# Patient Record
Sex: MALE | Race: BLACK OR AFRICAN AMERICAN | Employment: OTHER | URBAN - METROPOLITAN AREA
[De-identification: names, ages, dates, MRNs, and addresses within clinical notes are randomized per-mention and may not be internally consistent; named-entity substitution may affect disease eponyms.]

---

## 2022-12-05 ENCOUNTER — APPOINTMENT (OUTPATIENT)
Dept: GENERAL RADIOLOGY | Age: 77
End: 2022-12-05
Payer: OTHER MISCELLANEOUS

## 2022-12-05 ENCOUNTER — APPOINTMENT (OUTPATIENT)
Dept: CT IMAGING | Age: 77
End: 2022-12-05
Payer: OTHER MISCELLANEOUS

## 2022-12-05 ENCOUNTER — HOSPITAL ENCOUNTER (OUTPATIENT)
Age: 77
Setting detail: OBSERVATION
Discharge: HOME | End: 2022-12-06
Attending: EMERGENCY MEDICINE | Admitting: INTERNAL MEDICINE
Payer: OTHER MISCELLANEOUS

## 2022-12-05 DIAGNOSIS — R40.2412 GLASGOW COMA SCALE TOTAL SCORE 13-15, AT ARRIVAL TO EMERGENCY DEPARTMENT: ICD-10-CM

## 2022-12-05 DIAGNOSIS — T14.90XA TRAUMA: Primary | ICD-10-CM

## 2022-12-05 DIAGNOSIS — R41.0 CONFUSION: ICD-10-CM
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
## 2022-12-05 PROBLEM — S06.0XAA CONCUSSION: Status: ACTIVE | Noted: 2022-12-05

## 2022-12-05 PROBLEM — R41.82 ALTERED MENTAL STATUS: Status: ACTIVE | Noted: 2022-12-05

## 2022-12-05 LAB
ACETAMINOPHEN LEVEL: <5 MCG/ML (ref 10–30)
ALBUMIN SERPL-MCNC: 4.1 G/DL (ref 3.5–5.2)
ALP BLD-CCNC: 68 U/L (ref 40–129)
ALT SERPL-CCNC: <5 U/L (ref 0–40)
AMPHETAMINE SCREEN, URINE: NOT DETECTED
ANION GAP SERPL CALCULATED.3IONS-SCNC: 12 MMOL/L (ref 7–16)
APTT: 29.3 SEC (ref 24.5–35.1)
AST SERPL-CCNC: 14 U/L (ref 0–39)
BARBITURATE SCREEN URINE: NOT DETECTED
BASOPHILS ABSOLUTE: 0.04 E9/L (ref 0–0.2)
BASOPHILS RELATIVE PERCENT: 0.5 % (ref 0–2)
BENZODIAZEPINE SCREEN, URINE: NOT DETECTED
BILIRUB SERPL-MCNC: 0.5 MG/DL (ref 0–1.2)
BILIRUBIN URINE: NEGATIVE
BLOOD, URINE: NEGATIVE
BUN BLDV-MCNC: 14 MG/DL (ref 6–23)
CALCIUM SERPL-MCNC: 9.2 MG/DL (ref 8.6–10.2)
CANNABINOID SCREEN URINE: NOT DETECTED
CHLORIDE BLD-SCNC: 101 MMOL/L (ref 98–107)
CLARITY: CLEAR
CO2: 24 MMOL/L (ref 22–29)
COCAINE METABOLITE SCREEN URINE: NOT DETECTED
COLOR: YELLOW
CREAT SERPL-MCNC: 1.1 MG/DL (ref 0.7–1.2)
EOSINOPHILS ABSOLUTE: 0.2 E9/L (ref 0.05–0.5)
EOSINOPHILS RELATIVE PERCENT: 2.3 % (ref 0–6)
ETHANOL: <10 MG/DL (ref 0–0.08)
FENTANYL SCREEN, URINE: NOT DETECTED
GFR SERPL CREATININE-BSD FRML MDRD: >60 ML/MIN/1.73
GLUCOSE BLD-MCNC: 120 MG/DL (ref 74–99)
GLUCOSE URINE: NEGATIVE MG/DL
HCT VFR BLD CALC: 44.4 % (ref 37–54)
HEMOGLOBIN: 14.7 G/DL (ref 12.5–16.5)
IMMATURE GRANULOCYTES #: 0.02 E9/L
IMMATURE GRANULOCYTES %: 0.2 % (ref 0–5)
INR BLD: 1.3
KETONES, URINE: NEGATIVE MG/DL
LACTIC ACID: 2 MMOL/L (ref 0.5–2.2)
LEUKOCYTE ESTERASE, URINE: NEGATIVE
LYMPHOCYTES ABSOLUTE: 2.84 E9/L (ref 1.5–4)
LYMPHOCYTES RELATIVE PERCENT: 32.8 % (ref 20–42)
Lab: NORMAL
MAGNESIUM: 1.9 MG/DL (ref 1.6–2.6)
MCH RBC QN AUTO: 30.6 PG (ref 26–35)
MCHC RBC AUTO-ENTMCNC: 33.1 % (ref 32–34.5)
MCV RBC AUTO: 92.5 FL (ref 80–99.9)
METHADONE SCREEN, URINE: NOT DETECTED
MONOCYTES ABSOLUTE: 0.75 E9/L (ref 0.1–0.95)
MONOCYTES RELATIVE PERCENT: 8.7 % (ref 2–12)
NEUTROPHILS ABSOLUTE: 4.8 E9/L (ref 1.8–7.3)
NEUTROPHILS RELATIVE PERCENT: 55.5 % (ref 43–80)
NITRITE, URINE: NEGATIVE
OPIATE SCREEN URINE: NOT DETECTED
OXYCODONE URINE: NOT DETECTED
PDW BLD-RTO: 14.7 FL (ref 11.5–15)
PH UA: 6 (ref 5–9)
PHENCYCLIDINE SCREEN URINE: NOT DETECTED
PLATELET # BLD: 365 E9/L (ref 130–450)
PMV BLD AUTO: 11 FL (ref 7–12)
POTASSIUM SERPL-SCNC: 3.7 MMOL/L (ref 3.5–5)
PROTEIN UA: NEGATIVE MG/DL
PROTHROMBIN TIME: 14.4 SEC (ref 9.3–12.4)
RBC # BLD: 4.8 E12/L (ref 3.8–5.8)
SALICYLATE, SERUM: <0.3 MG/DL (ref 0–30)
SODIUM BLD-SCNC: 137 MMOL/L (ref 132–146)
SPECIFIC GRAVITY UA: 1.01 (ref 1–1.03)
TOTAL CK: 157 U/L (ref 20–200)
TOTAL PROTEIN: 7.4 G/DL (ref 6.4–8.3)
TRICYCLIC ANTIDEPRESSANTS SCREEN SERUM: NEGATIVE NG/ML
TROPONIN, HIGH SENSITIVITY: 27 NG/L (ref 0–11)
TROPONIN, HIGH SENSITIVITY: 41 NG/L (ref 0–11)
UROBILINOGEN, URINE: 0.2 E.U./DL
WBC # BLD: 8.7 E9/L (ref 4.5–11.5)

## 2022-12-05 PROCEDURE — G0378 HOSPITAL OBSERVATION PER HR: HCPCS

## 2022-12-05 PROCEDURE — 99285 EMERGENCY DEPT VISIT HI MDM: CPT

## 2022-12-05 PROCEDURE — 6830039000 HC L3 TRAUMA ALERT

## 2022-12-05 PROCEDURE — 80307 DRUG TEST PRSMV CHEM ANLYZR: CPT

## 2022-12-05 PROCEDURE — 85025 COMPLETE CBC W/AUTO DIFF WBC: CPT

## 2022-12-05 PROCEDURE — 2580000003 HC RX 258: Performed by: EMERGENCY MEDICINE

## 2022-12-05 PROCEDURE — 6370000000 HC RX 637 (ALT 250 FOR IP): Performed by: INTERNAL MEDICINE

## 2022-12-05 PROCEDURE — 6360000004 HC RX CONTRAST MEDICATION: Performed by: RADIOLOGY

## 2022-12-05 PROCEDURE — 82550 ASSAY OF CK (CPK): CPT

## 2022-12-05 PROCEDURE — 74177 CT ABD & PELVIS W/CONTRAST: CPT

## 2022-12-05 PROCEDURE — 72131 CT LUMBAR SPINE W/O DYE: CPT

## 2022-12-05 PROCEDURE — 80179 DRUG ASSAY SALICYLATE: CPT

## 2022-12-05 PROCEDURE — 72170 X-RAY EXAM OF PELVIS: CPT

## 2022-12-05 PROCEDURE — 84484 ASSAY OF TROPONIN QUANT: CPT

## 2022-12-05 PROCEDURE — 96372 THER/PROPH/DIAG INJ SC/IM: CPT

## 2022-12-05 PROCEDURE — 71260 CT THORAX DX C+: CPT

## 2022-12-05 PROCEDURE — 82077 ASSAY SPEC XCP UR&BREATH IA: CPT

## 2022-12-05 PROCEDURE — 72128 CT CHEST SPINE W/O DYE: CPT

## 2022-12-05 PROCEDURE — 99220 PR INITIAL OBSERVATION CARE/DAY 70 MINUTES: CPT | Performed by: INTERNAL MEDICINE

## 2022-12-05 PROCEDURE — 2580000003 HC RX 258: Performed by: INTERNAL MEDICINE

## 2022-12-05 PROCEDURE — 93005 ELECTROCARDIOGRAM TRACING: CPT | Performed by: EMERGENCY MEDICINE

## 2022-12-05 PROCEDURE — 6360000002 HC RX W HCPCS: Performed by: INTERNAL MEDICINE

## 2022-12-05 PROCEDURE — 81003 URINALYSIS AUTO W/O SCOPE: CPT

## 2022-12-05 PROCEDURE — 85730 THROMBOPLASTIN TIME PARTIAL: CPT

## 2022-12-05 PROCEDURE — 72125 CT NECK SPINE W/O DYE: CPT

## 2022-12-05 PROCEDURE — 80053 COMPREHEN METABOLIC PANEL: CPT

## 2022-12-05 PROCEDURE — 83735 ASSAY OF MAGNESIUM: CPT

## 2022-12-05 PROCEDURE — 80143 DRUG ASSAY ACETAMINOPHEN: CPT

## 2022-12-05 PROCEDURE — 36415 COLL VENOUS BLD VENIPUNCTURE: CPT

## 2022-12-05 PROCEDURE — 70450 CT HEAD/BRAIN W/O DYE: CPT

## 2022-12-05 PROCEDURE — 71045 X-RAY EXAM CHEST 1 VIEW: CPT

## 2022-12-05 PROCEDURE — 85610 PROTHROMBIN TIME: CPT

## 2022-12-05 PROCEDURE — 83605 ASSAY OF LACTIC ACID: CPT

## 2022-12-05 RX ORDER — ATORVASTATIN CALCIUM 10 MG/1
TABLET, FILM COATED ORAL DAILY
Status: ON HOLD | COMMUNITY
End: 2022-12-06 | Stop reason: HOSPADM

## 2022-12-05 RX ORDER — LANOLIN ALCOHOL/MO/W.PET/CERES
1000 CREAM (GRAM) TOPICAL DAILY
Status: DISCONTINUED | OUTPATIENT
Start: 2022-12-05 | End: 2022-12-06 | Stop reason: HOSPADM

## 2022-12-05 RX ORDER — ACETAMINOPHEN 325 MG/1
650 TABLET ORAL EVERY 6 HOURS PRN
Status: DISCONTINUED | OUTPATIENT
Start: 2022-12-05 | End: 2022-12-06 | Stop reason: HOSPADM

## 2022-12-05 RX ORDER — ACETAMINOPHEN 650 MG/1
650 SUPPOSITORY RECTAL EVERY 6 HOURS PRN
Status: DISCONTINUED | OUTPATIENT
Start: 2022-12-05 | End: 2022-12-06 | Stop reason: HOSPADM

## 2022-12-05 RX ORDER — SODIUM CHLORIDE 9 MG/ML
INJECTION, SOLUTION INTRAVENOUS PRN
Status: DISCONTINUED | OUTPATIENT
Start: 2022-12-05 | End: 2022-12-06 | Stop reason: HOSPADM

## 2022-12-05 RX ORDER — ENOXAPARIN SODIUM 100 MG/ML
40 INJECTION SUBCUTANEOUS DAILY
Status: DISCONTINUED | OUTPATIENT
Start: 2022-12-05 | End: 2022-12-06 | Stop reason: HOSPADM

## 2022-12-05 RX ORDER — VERAPAMIL HYDROCHLORIDE 240 MG/1
240 CAPSULE, EXTENDED RELEASE ORAL NIGHTLY
COMMUNITY

## 2022-12-05 RX ORDER — SODIUM CHLORIDE 9 MG/ML
INJECTION, SOLUTION INTRAVENOUS CONTINUOUS
Status: DISCONTINUED | OUTPATIENT
Start: 2022-12-05 | End: 2022-12-06

## 2022-12-05 RX ORDER — LANOLIN ALCOHOL/MO/W.PET/CERES
1000 CREAM (GRAM) TOPICAL DAILY
COMMUNITY

## 2022-12-05 RX ORDER — TADALAFIL 20 MG/1
20 TABLET ORAL PRN
COMMUNITY

## 2022-12-05 RX ORDER — PROMETHAZINE HYDROCHLORIDE 25 MG/1
12.5 TABLET ORAL EVERY 6 HOURS PRN
Status: DISCONTINUED | OUTPATIENT
Start: 2022-12-05 | End: 2022-12-06 | Stop reason: HOSPADM

## 2022-12-05 RX ORDER — POLYETHYLENE GLYCOL 3350 17 G/17G
17 POWDER, FOR SOLUTION ORAL DAILY PRN
Status: DISCONTINUED | OUTPATIENT
Start: 2022-12-05 | End: 2022-12-06 | Stop reason: HOSPADM

## 2022-12-05 RX ORDER — SODIUM CHLORIDE 0.9 % (FLUSH) 0.9 %
10 SYRINGE (ML) INJECTION PRN
Status: DISCONTINUED | OUTPATIENT
Start: 2022-12-05 | End: 2022-12-06 | Stop reason: HOSPADM

## 2022-12-05 RX ORDER — ONDANSETRON 2 MG/ML
4 INJECTION INTRAMUSCULAR; INTRAVENOUS EVERY 6 HOURS PRN
Status: DISCONTINUED | OUTPATIENT
Start: 2022-12-05 | End: 2022-12-06 | Stop reason: HOSPADM

## 2022-12-05 RX ORDER — COLCHICINE 0.6 MG/1
0.6 TABLET ORAL DAILY
Status: DISCONTINUED | OUTPATIENT
Start: 2022-12-05 | End: 2022-12-06 | Stop reason: HOSPADM

## 2022-12-05 RX ORDER — SODIUM CHLORIDE 0.9 % (FLUSH) 0.9 %
10 SYRINGE (ML) INJECTION EVERY 12 HOURS SCHEDULED
Status: DISCONTINUED | OUTPATIENT
Start: 2022-12-05 | End: 2022-12-06 | Stop reason: HOSPADM

## 2022-12-05 RX ORDER — CYANOCOBALAMIN 1000 UG/ML
1000 INJECTION, SOLUTION INTRAMUSCULAR; SUBCUTANEOUS
COMMUNITY

## 2022-12-05 RX ORDER — COLCHICINE 0.6 MG/1
0.6 TABLET ORAL DAILY
COMMUNITY

## 2022-12-05 RX ADMIN — SODIUM CHLORIDE: 9 INJECTION, SOLUTION INTRAVENOUS at 22:10

## 2022-12-05 RX ADMIN — VERAPAMIL HYDROCHLORIDE 240 MG: 120 TABLET, FILM COATED, EXTENDED RELEASE ORAL at 22:06

## 2022-12-05 RX ADMIN — SODIUM CHLORIDE, PRESERVATIVE FREE 10 ML: 5 INJECTION INTRAVENOUS at 22:10

## 2022-12-05 RX ADMIN — SODIUM CHLORIDE, PRESERVATIVE FREE 10 ML: 5 INJECTION INTRAVENOUS at 10:21

## 2022-12-05 RX ADMIN — SODIUM CHLORIDE: 9 INJECTION, SOLUTION INTRAVENOUS at 01:59

## 2022-12-05 RX ADMIN — ENOXAPARIN SODIUM 40 MG: 100 INJECTION SUBCUTANEOUS at 10:20

## 2022-12-05 RX ADMIN — IOPAMIDOL 75 ML: 755 INJECTION, SOLUTION INTRAVENOUS at 02:19

## 2022-12-05 RX ADMIN — CYANOCOBALAMIN TAB 1000 MCG 1000 MCG: 1000 TAB at 18:27

## 2022-12-05 RX ADMIN — ACETAMINOPHEN 650 MG: 325 TABLET ORAL at 16:51

## 2022-12-05 ASSESSMENT — PAIN SCALES - GENERAL
PAINLEVEL_OUTOF10: 3
PAINLEVEL_OUTOF10: 0
PAINLEVEL_OUTOF10: 0

## 2022-12-05 ASSESSMENT — PAIN DESCRIPTION - DESCRIPTORS: DESCRIPTORS: SORE;ACHING

## 2022-12-05 ASSESSMENT — PAIN DESCRIPTION - LOCATION: LOCATION: SHOULDER

## 2022-12-05 ASSESSMENT — PAIN - FUNCTIONAL ASSESSMENT: PAIN_FUNCTIONAL_ASSESSMENT: NONE - DENIES PAIN

## 2022-12-05 ASSESSMENT — PAIN DESCRIPTION - ORIENTATION: ORIENTATION: RIGHT
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
## 2022-12-05 NOTE — PROGRESS NOTES
Updated daughter Leonie Boone of patients current status. She would like a call from social work tomorrow just for updates on discharge planning. I did explain that Karlee Cruz and wilder Meeks were here today and that they could pick him up at discharge and she was happy with that. She stated they will then pick him up from [place] ([address] [facility] [address] [facility]) and take him back to [place]. Leonie Boone was also in contact with the [place] [employer] where his car is currently and the only way the car can be taken is if he physically comes or if a POA goes to get it, in which he doesn't have an appointed POA.

## 2022-12-05 NOTE — ED NOTES
Report called to floor. All belongings patient arrived with sent with patient. Patient is stable upon transfer.       Lisa Young RN  12/05/22 0897
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
## 2022-12-05 NOTE — CARE COORDINATION
SOCIAL WORK / DISCHARGE PLANNING:   Sw spoke with pt, sister, Javier Kwok and niyunior Constantino at bedside. They are inquiring where pt's truck is located. Sw obtained info from ED that EMT ambulance brought pt in. Sw spoke with EMT and found out that accident may or may not have occurred on [facility]. Sw provided sister with number to [employer] to call and inquire on whereabouts of truck. Sister and niece contact info was saved. Pt was in route from [place] to [place] to see family and usually stops in [facility] [place] where his sister resides. She states that he got turned around due to poor cell reception and road construction. He can stay with her at discharge and niConstantino mojica states she will come and provide transport. She resides in [place], [place]. Pt reports to have walker in truck but does not always use it. Pt speaks in a low, mumbling manner but is becoming clearer in his mental status. Sw will follow, assist with further dc planning.              Electronically signed by YENI Herrera on 12/5/2022 at 3:05 PM

## 2022-12-05 NOTE — ED NOTES
Patient was complaining of SOB while on 5L NC. He received BUMEX 4mg IV at 640pm.  He was satting at 92% at that time. NC was increased to 6L. Patient said he still felt sob, and RR was 25s-30s. On exam crackles appreciated b/l at the base. Patient was reexamined 8mins after changing NC and he is still satting at 92% with same work of breathing. Bipap ordered. MICU consulted as patient has worsening kidney function and is becoming anuric with concern for need of urgent hemodialysis. Nephro was consulted in regards to if the patient will require HD tonight. Patient was hypoxic to 90% on 6L NC, he endorsed sob. On exam crackles appreciated b/l at bases. Gave 40mg IV Lasix and started on Bipap at 10/5 with 40% FiO2. Preop Dx: right toe wound  Surgeon: Dr. Mcginnis   Procedure: Right Lower Extremity Angiogram, possible plasty, possible stent    Vital Signs Last 24 Hrs  T(C): 36.7 (28 Jul 2021 06:15), Max: 36.9 (27 Jul 2021 17:42)  T(F): 98 (28 Jul 2021 06:15), Max: 98.5 (27 Jul 2021 22:20)  HR: 65 (28 Jul 2021 06:15) (65 - 75)  BP: 143/73 (28 Jul 2021 06:15) (136/70 - 159/80)  BP(mean): --  RR: 16 (28 Jul 2021 06:15) (16 - 18)  SpO2: 100% (28 Jul 2021 06:15) (96% - 100%)                        10.8   15.25 )-----------( 533      ( 28 Jul 2021 06:36 )             32.8     07-28    135  |  93<L>  |  70<H>  ----------------------------<  173<H>  3.6   |  23  |  2.34<H>    Ca    9.8      28 Jul 2021 06:36  Phos  5.0     07-28  Mg     2.30     07-28    TPro  7.8  /  Alb  3.3  /  TBili  0.3  /  DBili  x   /  AST  22  /  ALT  30  /  AlkPhos  267<H>  07-28    PT/INR - ( 28 Jul 2021 06:36 )   PT: 14.2 sec;   INR: 1.26 ratio         PTT - ( 28 Jul 2021 06:36 )  PTT:31.5 sec  Daily     Daily     EKG:  CXR:   Type and Screen:         A/P: 62y Male     - Plan for Right LE angiogram on 7/29/21 with Dr. Mcginnis   - NPO past midnight, except medications  - IVF while NPO  - Consent to be signed and placed in chart  - Medical clearance for OR documented 7/27 RD visited with patient for nutrition f/u. HPI: 63 y/o male with h/o HTN, insulin dependent DM-2, hld, glaucoma, CKD3 with recent interstitial nephritis treated with steroid, HFrEF, recent NSTEMI, PAD s/p RLE angioplasty, s/p right 2nd toe partial amputation at [facility] in April 2021, who presents with 2 week history of worsening right foot pain, associated with right 2nd residual toe stump/3rd toe infection/discoloration to blue/black, xray c/f osteomyelitis, plan for surgical intervention this admission per podiatry. Hospital course has been complicated by flash pulmonary edema 2/2 to [place]STA on CKD. Now he is improving with diuresis. Per chart review, patient/family requesting discharge and wish to pursue a 2nd opinion with Justin.    Patient did not wish to speak with RD at time of visit, as he was awaiting to speak with physician regarding his foot. Family at bedside. Chart was reviewed and it is  noted that patient is eating well and tolerating diet. No reported GI distress i.e. nauea, vomiting, diarrhea. Previous diet instruction noted.    Source: Patient [ ]    Family [ ]     other [ X ]  Chart Review    Diet, Renal Restrictions:   For patients receiving Renal Replacement - No Protein Restr, No Conc K, No Conc Phos, Low Sodium  Consistent Carbohydrate {No Snacks} (CSTCHO)  DASH/TLC {Sodium & Cholesterol Restricted} (DASH) (07-29-21 @ 16:12)    Current Weight: 8/1 - 87.1kg      7/18 - 82.8kg       Pertinent Medications: atorvastatin  buMETAnide  carvedilol  dextrose 40% Gel  dextrose 5%.  dextrose 5%.  dextrose 50% Injectable  dextrose 50% Injectable  dextrose 50% Injectable  glucagon  Injectable  hydrALAZINE  insulin glargine Injectable (LANTUS)  insulin lispro (ADMELOG) corrective regimen sliding scale  insulin lispro Injectable (ADMELOG)  isosorbide   dinitrate Tablet (ISORDIL)  melatonin  NIFEdipine XL  polyethylene glycol 3350  senna  sodium chloride 0.9%.  sodium chloride 0.9%.    Pertinent Labs:  08-04 Na136 mmol/L Glu 210 mg/dL<H> K+ 3.8 mmol/L Cr  2.25 mg/dL<H> BUN 68 mg/dL<H> 08-04 Phos 4.1 mg/dL 08-04 Alb 3.5 g/dL 07-19 Chol 116 mg/dL LDL --    HDL 26 mg/dL<L> Trig 122 mg/dL    CAPILLARY BLOOD GLUCOSE  POCT Blood Glucose.: 201 mg/dL (04 Aug 2021 12:22)  POCT Blood Glucose.: 197 mg/dL (04 Aug 2021 08:27)  POCT Blood Glucose.: 290 mg/dL (03 Aug 2021 21:47)  POCT Blood Glucose.: 201 mg/dL (03 Aug 2021 17:10)    Skin: No pressure injuries, No edema noted per nursing flowsheets    Estimated Needs:   [ X ] no change since previous assessment  [ ] recalculated:       Previous Nutrition Diagnosis: Altered nutrition related labs    Nutrition Diagnosis is [ X ] ongoing  [ ] resolved [ ] not applicable       Additional Recommendations:  1) Continue diet as ordered;    Monitoring & Evaluation  [X] Weights   [X] PO Intake   [X] Skin Integrity   [X] Tolerance to Diet Prescription   [X] Other: Labs    RD to follow - Lucille Pierre MS, RD, CDN Pager #[phone] Vascular surgery team reconsulted earlier today per podiatry recommendations given worsening ischemic changes extending to 4th digit and plantar forefoot. Awaiting recommendations. ekg performed at bedside      [facility]  12/05/22 0142 pt seen and examined. vitals, labs reviewed. Case discussed with vascular and podiatry- pt has severe PAD on angiogram and failed revascularization w angioplasty. no further vascular procedure is offered at this time. podiatry unable to do toe resection d/t concern of poor healing w/o revascularization, now awaiting demarcation. Case also discussed with ID- without surgery foot infection cannot be completely treated with abx alone. Could transition to augmentin/bactrim in the interim if pt leaves the hospital. I had an extensive discussion with pt, wife, sister regarding to pt's current status and each specialty's recommendations- pt/family verbalized understanding and requested discharge so they can seek a second opinion at [facility]. Pt is stable for discharge today. All medical records and RX for oral abx sent for pt. Family is taking pt directly to [facility] ER after discharge.     total time spent on dc 45min ACP Team PA Note    L groin check s/p RLE angiogram. Pt denies any complaints at the site.     Vital Signs Last 24 Hrs  T(C): 36.3 (29 Jul 2021 20:55), Max: 36.8 (29 Jul 2021 05:00)  T(F): 97.4 (29 Jul 2021 20:55), Max: 98.3 (29 Jul 2021 05:00)  HR: 67 (29 Jul 2021 20:55) (65 - 77)  BP: 138/62 (29 Jul 2021 20:55) (126/66 - 143/64)  BP(mean): --  RR: 18 (29 Jul 2021 20:55) (16 - 19)  SpO2: 99% (29 Jul 2021 20:55) (97% - 99%)    -- site is C/D/I, no bleeding/no hematoma/no tenderness to palpation/ no swelling,   -- pulses- L femoral, L DP, and L PT-- 2 +   -- continue monitoring Pt seen post operatively. Denies any complaints including leg pain, bleeding, numbness, weakness, subjective fevers, chills.    PHYSICAL EXAM:      Constitutional: NAD, laying in bed comfortably     Respiratory: symmetrical chest rise, non-labored breathing    Cardiovascular: RRR    Gastrointestinal: soft, non-tender, non-distended     Extremities: Right leg tegaderm in place, no bleeding at site. No thrill palpable. Movement to all extremities b/l, no loss of sensation or numbness tingling    Post Operative Check    Patient is post op from a RLE angiogram and is recovering well in room w/o pain, signs of bleeding diathesis, or access site complication.

## 2022-12-05 NOTE — PROGRESS NOTES
Notified Tripp Dowling of hypertension this evening, no new orders at this time. Will continue to monitor blood pressures.

## 2022-12-05 NOTE — ED NOTES
Xray at bedside      [facility], [address] [place] [address]  12/05/22 9459

## 2022-12-05 NOTE — ED NOTES
0128 Hrs - Trauma Alert called  0140 Hrs - Dr Kt Austin called in for status     Acquanetta Specking  12/05/22 0142

## 2022-12-05 NOTE — ED NOTES
This RN has assumed care. C-collar can be removed per Dr. Mike Clemente.      Nayeli Watters RN  12/05/22 0761

## 2022-12-05 NOTE — ED NOTES
C-collar removed per this RN. Pt.  Remains alert to name only. Unable to provide family name/phone #. Unsure of year.      Abigail Price RN  12/05/22 9828

## 2022-12-06 VITALS
DIASTOLIC BLOOD PRESSURE: 95 MMHG | SYSTOLIC BLOOD PRESSURE: 136 MMHG | HEIGHT: 75 IN | HEART RATE: 82 BPM | BODY MASS INDEX: 24.92 KG/M2 | OXYGEN SATURATION: 98 % | TEMPERATURE: 97.7 F | RESPIRATION RATE: 18 BRPM | WEIGHT: 200.4 LBS

## 2022-12-06 PROBLEM — I10 PRIMARY HYPERTENSION: Status: ACTIVE | Noted: 2022-12-06
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
## 2022-12-06 LAB
ALBUMIN SERPL-MCNC: 3.4 G/DL (ref 3.5–5.2)
ALP BLD-CCNC: 60 U/L (ref 40–129)
ALT SERPL-CCNC: 6 U/L (ref 0–40)
ANION GAP SERPL CALCULATED.3IONS-SCNC: 9 MMOL/L (ref 7–16)
AST SERPL-CCNC: 12 U/L (ref 0–39)
BASOPHILS ABSOLUTE: 0.02 E9/L (ref 0–0.2)
BASOPHILS RELATIVE PERCENT: 0.3 % (ref 0–2)
BILIRUB SERPL-MCNC: 0.7 MG/DL (ref 0–1.2)
BUN BLDV-MCNC: 11 MG/DL (ref 6–23)
CALCIUM SERPL-MCNC: 8.5 MG/DL (ref 8.6–10.2)
CHLORIDE BLD-SCNC: 103 MMOL/L (ref 98–107)
CO2: 26 MMOL/L (ref 22–29)
CREAT SERPL-MCNC: 1 MG/DL (ref 0.7–1.2)
EKG ATRIAL RATE: 81 BPM
EKG P AXIS: 87 DEGREES
EKG P-R INTERVAL: 162 MS
EKG Q-T INTERVAL: 446 MS
EKG QRS DURATION: 122 MS
EKG QTC CALCULATION (BAZETT): 518 MS
EKG R AXIS: -39 DEGREES
EKG T AXIS: 111 DEGREES
EKG VENTRICULAR RATE: 81 BPM
EOSINOPHILS ABSOLUTE: 0.15 E9/L (ref 0.05–0.5)
EOSINOPHILS RELATIVE PERCENT: 2.1 % (ref 0–6)
GFR SERPL CREATININE-BSD FRML MDRD: >60 ML/MIN/1.73
GLUCOSE BLD-MCNC: 102 MG/DL (ref 74–99)
HCT VFR BLD CALC: 38.3 % (ref 37–54)
HEMOGLOBIN: 12.9 G/DL (ref 12.5–16.5)
IMMATURE GRANULOCYTES #: 0.03 E9/L
IMMATURE GRANULOCYTES %: 0.4 % (ref 0–5)
LYMPHOCYTES ABSOLUTE: 2.21 E9/L (ref 1.5–4)
LYMPHOCYTES RELATIVE PERCENT: 30.9 % (ref 20–42)
MCH RBC QN AUTO: 30.7 PG (ref 26–35)
MCHC RBC AUTO-ENTMCNC: 33.7 % (ref 32–34.5)
MCV RBC AUTO: 91.2 FL (ref 80–99.9)
MONOCYTES ABSOLUTE: 0.57 E9/L (ref 0.1–0.95)
MONOCYTES RELATIVE PERCENT: 8 % (ref 2–12)
NEUTROPHILS ABSOLUTE: 4.18 E9/L (ref 1.8–7.3)
NEUTROPHILS RELATIVE PERCENT: 58.3 % (ref 43–80)
PDW BLD-RTO: 14.7 FL (ref 11.5–15)
PLATELET # BLD: 316 E9/L (ref 130–450)
PMV BLD AUTO: 11.4 FL (ref 7–12)
POTASSIUM REFLEX MAGNESIUM: 3.9 MMOL/L (ref 3.5–5)
RBC # BLD: 4.2 E12/L (ref 3.8–5.8)
SODIUM BLD-SCNC: 138 MMOL/L (ref 132–146)
TOTAL PROTEIN: 6.4 G/DL (ref 6.4–8.3)
WBC # BLD: 7.2 E9/L (ref 4.5–11.5)

## 2022-12-06 PROCEDURE — 85025 COMPLETE CBC W/AUTO DIFF WBC: CPT

## 2022-12-06 PROCEDURE — 2580000003 HC RX 258: Performed by: INTERNAL MEDICINE

## 2022-12-06 PROCEDURE — 36415 COLL VENOUS BLD VENIPUNCTURE: CPT

## 2022-12-06 PROCEDURE — 96372 THER/PROPH/DIAG INJ SC/IM: CPT

## 2022-12-06 PROCEDURE — 6370000000 HC RX 637 (ALT 250 FOR IP): Performed by: INTERNAL MEDICINE

## 2022-12-06 PROCEDURE — 80053 COMPREHEN METABOLIC PANEL: CPT

## 2022-12-06 PROCEDURE — G0378 HOSPITAL OBSERVATION PER HR: HCPCS

## 2022-12-06 PROCEDURE — 6360000002 HC RX W HCPCS: Performed by: INTERNAL MEDICINE

## 2022-12-06 RX ADMIN — CYANOCOBALAMIN TAB 1000 MCG 1000 MCG: 1000 TAB at 09:17

## 2022-12-06 RX ADMIN — SODIUM CHLORIDE, PRESERVATIVE FREE 10 ML: 5 INJECTION INTRAVENOUS at 09:17

## 2022-12-06 RX ADMIN — ENOXAPARIN SODIUM 40 MG: 100 INJECTION SUBCUTANEOUS at 09:17

## 2022-12-06 ASSESSMENT — PAIN SCALES - GENERAL
PAINLEVEL_OUTOF10: 0
PAINLEVEL_OUTOF10: 0

## 2022-12-06 NOTE — CARE COORDINATION
SOCIAL WORK / DISCHARGE PLANNING:   Pt has been cleared for discharge, plan to dc and stay with his sister in [place] initially. Denny called Constantino hdz [phone], left message request return call to discuss when would be able to provide transport. No dc needs identified or requested at this time. Addendum: 343pm Denny spoke with pt's sister, Vonda Gaytan early today 10:44 am. She was upset that this Sw had called niece first although that is what niece had instructed her to do yesterday as she resides closer and drives. Sister is upset thinking pt was not to dc until Wed or Thurs. Sw informed that pt was medically cleared. She states she had called [employer] and was given number to [employer] and when she called it did not work. Denny called [employer] and spoke with gentleman who states a Gil was brought in about 130am 12/5 but could not say that it had [address] plates. He states he needs [employer] or license number. Denny discussed with pt and [facility] as well as insuring he had his sisters number. Denny now assisted pt to call sister as clothes have been obtained from [facility] for him. Pt has no other means of transport to sister's home unless niece comes to get him.          Electronically signed by YENI Montgomery on 12/6/2022 at 10:22 AM

## 2022-12-06 NOTE — PLAN OF CARE
Problem: Discharge Planning  Goal: Discharge to home or other facility with appropriate resources  12/6/2022 1022 by Sedrick Alvarado RN  Outcome: Adequate for Discharge  12/6/2022 0339 by Deborah Johnson RN  Outcome: Progressing     Problem: Safety - Adult  Goal: Free from fall injury  12/6/2022 1022 by Sedrick Alvarado RN  Outcome: Adequate for Discharge  12/6/2022 0339 by Deborah Johnson RN  Outcome: Progressing     Problem: Pain  Goal: Verbalizes/displays adequate comfort level or baseline comfort level  12/6/2022 1022 by Sedrick Alvarado RN  Outcome: Adequate for Discharge  12/6/2022 0339 by Deborah Johnson RN  Outcome: Progressing
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
## 2022-12-06 NOTE — DISCHARGE INSTRUCTIONS
Do not drive until OK with your primary care physician      Concussion: Care Instructions  Overview     A concussion is a kind of injury to the brain. It happens when the head receives a hard blow. The impact can jar or shake the brain against the skull. This interrupts the brain's normal activities. Although you may have cuts or bruises on your head or face, you may have no other visible signs of a brain injury. In most cases, damage to the brain from a concussion can't be seen in tests such as a CT or MRI scan. For a few weeks, you may have low energy, dizziness, trouble sleeping, a headache, ringing in your ears, or nausea. You may also feel anxious, grumpy, or depressed. You may have problems with memory and concentration. These symptoms are common after a concussion. They should slowly improve over time. Sometimes this takes weeks or even months. Someone who lives with you should know how to care for you. Please share this and all information with a caregiver who will be available to help if needed. Follow-up care is a key part of your treatment and safety. Be sure to make and go to all appointments, and call your doctor if you are having problems. It's also a good idea to know your test results and keep a list of the medicines you take. How can you care for yourself at home? Pain control  Put ice or a cold pack on the part of your head that hurts for 10 to 20 minutes at a time. Put a thin cloth between the ice and your skin. Ask your doctor if you can take an over-the-counter pain medicine, such as acetaminophen (Tylenol), ibuprofen (Advil, Motrin), or naproxen (Aleve). Be safe with medicines. Read and follow all instructions on the label. Recovery  Follow your doctor's instructions. The doctor will tell you if you need someone to watch you closely for the next 24 hours or longer. Rest is the best way to recover from a concussion. You need to rest your body and your brain:  Get plenty of sleep at night. And take rest breaks during the day. Avoid activities that take a lot of physical or mental work. This includes housework, exercise, schoolwork, video games, text messaging, and using the computer. You may need to change your school or work schedule while you recover. Return to your normal activities slowly. Do not try to do too much at once. Do not drink alcohol or use illegal drugs. Alcohol and illegal drugs can slow your recovery. And they can increase your risk of a second brain injury. Avoid activities that could lead to another concussion. Follow your doctor's instructions for a gradual return to activity and sports. Ask your doctor when it's okay for you to drive a car, ride a bike, or operate machinery. How should you return to activity? Your return to activity can begin after 1 to 2 days of physical and mental rest. After resting, you can gradually increase your activity as long as it does not cause new symptoms or worsen your symptoms. Doctors and concussion specialists suggest steps to follow for returning to sports after a concussion. Use these steps as a guide. You should slowly progress through the following levels of activity:  Limited activity. You can take part in daily activities as long as the activity doesn't increase your symptoms or cause new symptoms. Light aerobic activity. This can include walking, swimming, or other exercise. No resistance training is included in this step. Sport-specific exercise. This includes running drills or skating drills (depending on the sport), but no head impact. Noncontact training drills. This includes more complex training drills such as passing. The athlete may also begin light resistance training. Full-contact practice. The athlete can participate in normal training. Return to normal game play. This is the final step and allows the athlete to join in normal game play. Watch and keep track of your progress.  It should take at least 6 days for you to go from light activity to normal game play. Make sure that you can stay at each new level of activity for at least 24 hours without symptoms, or as long as your doctor says, before doing more. If one or more symptoms come back, return to a lower level of activity for at least 24 hours. Don't move on until all symptoms are gone. When should you call for help? Call 911 anytime you think you may need emergency care. For example, call if:    You have a seizure. You passed out (lost consciousness). You are confused or can't stay awake. You have a headache that gets worse and does not go away. You have new vision changes or one pupil (the black part in the middle of the eye) that is larger than the other. You have slurred speech, balance problems, or decreased coordination. Call your doctor now or seek immediate medical care if:    You have new or worse vomiting. You feel less alert. You have new weakness or numbness in any part of your body. You have new symptoms, such as unclear thinking or changes in mood. Watch closely for changes in your health, and be sure to contact your doctor if:    You do not get better as expected. Where can you learn more? Go to [email or web address]://[email or web address] org and sign in to your [employer] account. Enter S621 in the [employer] box to learn more about \"Concussion: Care Instructions. \"     If you do not have an account, please click on the \"Sign Up Now\" link. Current as of: December 13, 2021               Content Version: 13.4  © 2006-2022 [employer]. Care instructions adapted under license by [facility] ([facility]). If you have questions about a medical condition or this instruction, always ask your healthcare professional. [place] [address] any warranty or liability for your use of this information.